# Patient Record
Sex: MALE | Race: BLACK OR AFRICAN AMERICAN | Employment: FULL TIME | URBAN - METROPOLITAN AREA
[De-identification: names, ages, dates, MRNs, and addresses within clinical notes are randomized per-mention and may not be internally consistent; named-entity substitution may affect disease eponyms.]

---

## 2022-07-18 ENCOUNTER — HOSPITAL ENCOUNTER (EMERGENCY)
Age: 22
Discharge: HOME | End: 2022-07-18

## 2022-07-18 VITALS
OXYGEN SATURATION: 99 % | DIASTOLIC BLOOD PRESSURE: 94 MMHG | RESPIRATION RATE: 18 BRPM | HEART RATE: 98 BPM | TEMPERATURE: 100.7 F | SYSTOLIC BLOOD PRESSURE: 148 MMHG | WEIGHT: 155.2 LBS

## 2022-07-18 DIAGNOSIS — R03.0 ELEVATED BLOOD PRESSURE READING: ICD-10-CM

## 2022-07-18 DIAGNOSIS — J02.0 STREP PHARYNGITIS: Primary | ICD-10-CM

## 2022-07-18 PROCEDURE — 6370000000 HC RX 637 (ALT 250 FOR IP): Performed by: PHYSICIAN ASSISTANT

## 2022-07-18 PROCEDURE — 6360000002 HC RX W HCPCS: Performed by: PHYSICIAN ASSISTANT

## 2022-07-18 PROCEDURE — 99283 EMERGENCY DEPT VISIT LOW MDM: CPT

## 2022-07-18 RX ORDER — DEXAMETHASONE 4 MG/1
10 TABLET ORAL ONCE
Status: COMPLETED | OUTPATIENT
Start: 2022-07-18 | End: 2022-07-18

## 2022-07-18 RX ORDER — IBUPROFEN 400 MG/1
800 TABLET ORAL ONCE
Status: COMPLETED | OUTPATIENT
Start: 2022-07-18 | End: 2022-07-18

## 2022-07-18 RX ORDER — AMOXICILLIN 500 MG/1
500 CAPSULE ORAL 2 TIMES DAILY
Qty: 20 CAPSULE | Refills: 0 | Status: SHIPPED | OUTPATIENT
Start: 2022-07-18 | End: 2022-07-20 | Stop reason: ALTCHOICE

## 2022-07-18 RX ORDER — AMOXICILLIN 500 MG/1
500 CAPSULE ORAL 2 TIMES DAILY
Qty: 20 CAPSULE | Refills: 0 | Status: SHIPPED | OUTPATIENT
Start: 2022-07-18 | End: 2022-07-18

## 2022-07-18 RX ADMIN — DEXAMETHASONE 10 MG: 4 TABLET ORAL at 15:17

## 2022-07-18 RX ADMIN — IBUPROFEN 800 MG: 400 TABLET, FILM COATED ORAL at 15:17

## 2022-07-18 NOTE — ED NOTES
Pt discharged back home, reviewed discharged instructions with pt follow up care , pain control and return precautions discussed , pt verbalized understanding.  Pt ambulated out of the department with steady gait          Trev Marcano RN  07/18/22 4743

## 2022-07-18 NOTE — LETTER
[facility]  [address] [address]  Phone: [phone]               July 18, 2022    Patient: Lisa Freire   YOB: 2000   Date of Visit: 7/18/2022       To Whom It May Concern:    Lisa Freire was seen and treated in our emergency department on 7/18/2022. He may return to work on 7/19/22.       Sincerely,               Signature:__________________________________

## 2022-07-18 NOTE — ED PROVIDER NOTES
[address] [facility]        Pt Name: Angelina Rose  MRN: [id number]  [place] 2000  Date of evaluation: 7/18/2022  Provider: CECILIA Moore      ADVANCED PRACTICE PROVIDER, I HAVE EVALUATED THIS PATIENT    CHIEF COMPLAINT     Sore throat      HISTORY OF PRESENT ILLNESS  (Location/Symptom, Timing/Onset, Context/Setting, Quality, Duration,Modifying Factors, Severity.)   Angelina Rose is a 22 y.o. male who presents to the emergencydepartment for sore throat since yesterday. Feels similar prior episode of strep throat. He has had a little bit of a nonproductive cough today. Denies known fever. Denies congestion rhinorrhea. Denies nausea vomiting. No health problems. No known sick exposures. Nursing Notes were reviewed and I agree. REVIEW OF SYSTEMS    (2-9 systems for level 4, 10 or more for level 5)   Review of Systems     Pertinent positives and negatives as per HPI. All other systems reviewed and are negative except as noted    PAST MEDICAL HISTORY   No past medical history on file. SURGICAL HISTORY   No past surgical history on file. CURRENT MEDICATIONS       Previous Medications    No medications on file       ALLERGIES     Patient has no known allergies. FAMILY HISTORY     No family history on file. No family status information on file. SOCIAL HISTORY          PHYSICAL EXAM    (up to 7 for level 4, 8 or more for level 5)     ED Triage Vitals [07/18/22 1456]   BP Temp Temp Source Heart Rate Resp SpO2 Height Weight   (!) 148/94 (!) 100.7 °F (38.2 °C) Oral 98 18 99 % -- 155 lb 3.3 oz (70.4 kg)       Physical Exam  Constitutional:       General: He is not in acute distress. Appearance: Normal appearance. He is well-developed. He is not ill-appearing, toxic-appearing or diaphoretic. HENT:      Head: Normocephalic and atraumatic.       Right Ear: Tympanic membrane, ear canal and external ear normal. Left Ear: Tympanic membrane, ear canal and external ear normal.      Mouth/Throat:      Mouth: Mucous membranes are moist.      Pharynx: Oropharyngeal exudate and posterior oropharyngeal erythema present. Comments: Moderate amount of symmetric tonsillar edema bilaterally with erythema and purulent exudate. Uvula midline. No trismus. No difficulty swallowing or handling secretions. No signs of airway compromise. No peritonsillar abscess. Cardiovascular:      Rate and Rhythm: Normal rate and regular rhythm. Heart sounds: Normal heart sounds. Pulmonary:      Effort: Pulmonary effort is normal. No respiratory distress. Breath sounds: Normal breath sounds. Musculoskeletal:         General: Normal range of motion. Cervical back: Normal range of motion and neck supple. Lymphadenopathy:      Cervical: Cervical adenopathy present. Skin:     General: Skin is warm. Neurological:      Mental Status: He is alert. Psychiatric:         Mood and Affect: Mood normal.         Behavior: Behavior normal.         Thought Content: Thought content normal.         Judgment: Judgment normal.       DIFFERENTIAL DIAGNOSIS   Strep, peritonsillar abscess, URI, COVID, other    DIAGNOSTICRESULTS         RADIOLOGY:   Non-plain film images such as CT, Ultrasound and MRI are read by the radiologist. Plain radiographic images are visualized and preliminarily interpreted by CECILIA Kilpatrick with the below findings:      Interpretation per the Radiologist below, if available at the time of this note:    No orders to display         LABS:  Labs Reviewed - No data to display    All other labs were within normal range or not returned as of this dictation.     EMERGENCY DEPARTMENT COURSE and DIFFERENTIALDIAGNOSIS/MDM:   Vitals:    Vitals:    07/18/22 1456   BP: (!) 148/94   Pulse: 98   Resp: 18   Temp: (!) 100.7 °F (38.2 °C)   TempSrc: Oral   SpO2: 99%   Weight: 155 lb 3.3 oz (70.4 kg)       Patient wasnontoxic, well appearing, febrile with a rate of 100.7 and hypertension 148/94. Saturating well on room air. Clinically, she has strep throat. No peritonsillar abscess. Will treat with amox. Given Decadron and ibuprofen here. Instructed t FU with PCP for reeval and to  return for worsening. He agreed and understood. Is this patient to be included in the SEP-1 Core Measure due to severe sepsis or septic shock? No   Exclusion criteria - the patient is NOT to be included for SEP-1 Core Measure due to:  May have criteria for sepsis, but does not meet criteria for severe sepsis or septic shock        PROCEDURES:  None    FINAL IMPRESSION      1. Strep pharyngitis    2. Elevated blood pressure reading        DISPOSITION/PLAN   DISPOSITION Decision To Discharge 07/18/2022 03:13:09 PM      PATIENT REFERRED TO:  [facility]) Pre-Services  [phone]  Schedule an appointment as soon as possible for a visit   for reevaluation and to recheck your blood pressure    [facility] Emergency Department  [address] [place] [address]  [phone]    As needed, If symptoms worsen      DISCHARGE MEDICATIONS:  New Prescriptions    AMOXICILLIN (AMOXIL) 500 MG CAPSULE    Take 1 capsule by mouth in the morning and 1 capsule before bedtime. Do all this for 10 days.        (Please note that portions ofthis note were completed with a voice recognition program.  Efforts were made to edit the dictations but occasionally words are mis-transcribed.)    Viviana Parikh, [address] [place] [address]  07/18/22 4717

## 2022-07-19 NOTE — ED NOTES
Patient was discharged a few hours ago. He forgot to take his paper prescription with him. He called the ER to see how he can get the prescription filled. I reviewed his record, found he was diagnosed clinically with strep throat, and a prescription for amoxicillin had been printed on paper. He requested that the prescription be sent to [email or web address] [address]. This was provided to him.      Angel Baileyma  07/18/22 2010

## 2022-07-20 ENCOUNTER — APPOINTMENT (OUTPATIENT)
Dept: CT IMAGING | Age: 22
End: 2022-07-20

## 2022-07-20 ENCOUNTER — HOSPITAL ENCOUNTER (EMERGENCY)
Age: 22
Discharge: HOME | End: 2022-07-21
Attending: EMERGENCY MEDICINE

## 2022-07-20 VITALS
WEIGHT: 153.44 LBS | HEART RATE: 88 BPM | BODY MASS INDEX: 24.08 KG/M2 | TEMPERATURE: 98 F | RESPIRATION RATE: 18 BRPM | OXYGEN SATURATION: 98 % | SYSTOLIC BLOOD PRESSURE: 140 MMHG | HEIGHT: 67 IN | DIASTOLIC BLOOD PRESSURE: 79 MMHG

## 2022-07-20 DIAGNOSIS — J36 PERITONSILLAR ABSCESS: Primary | ICD-10-CM

## 2022-07-20 LAB
A/G RATIO: 1.3 (ref 1.1–2.2)
ALBUMIN SERPL-MCNC: 4.4 G/DL (ref 3.4–5)
ALP BLD-CCNC: 76 U/L (ref 40–129)
ALT SERPL-CCNC: 13 U/L (ref 10–40)
ANION GAP SERPL CALCULATED.3IONS-SCNC: 15 MMOL/L (ref 3–16)
AST SERPL-CCNC: 16 U/L (ref 15–37)
BASOPHILS ABSOLUTE: 0.1 K/UL (ref 0–0.2)
BASOPHILS RELATIVE PERCENT: 0.7 %
BILIRUB SERPL-MCNC: 0.6 MG/DL (ref 0–1)
BUN BLDV-MCNC: 12 MG/DL (ref 7–20)
CALCIUM SERPL-MCNC: 9.1 MG/DL (ref 8.3–10.6)
CHLORIDE BLD-SCNC: 101 MMOL/L (ref 99–110)
CO2: 25 MMOL/L (ref 21–32)
CREAT SERPL-MCNC: 1.2 MG/DL (ref 0.9–1.3)
EOSINOPHILS ABSOLUTE: 0.3 K/UL (ref 0–0.6)
EOSINOPHILS RELATIVE PERCENT: 1.9 %
GFR AFRICAN AMERICAN: >60
GFR NON-AFRICAN AMERICAN: >60
GLUCOSE BLD-MCNC: 98 MG/DL (ref 70–99)
HCT VFR BLD CALC: 40.7 % (ref 40.5–52.5)
HEMOGLOBIN: 13.1 G/DL (ref 13.5–17.5)
LYMPHOCYTES ABSOLUTE: 1.7 K/UL (ref 1–5.1)
LYMPHOCYTES RELATIVE PERCENT: 9.7 %
MCH RBC QN AUTO: 23.4 PG (ref 26–34)
MCHC RBC AUTO-ENTMCNC: 32.1 G/DL (ref 31–36)
MCV RBC AUTO: 72.8 FL (ref 80–100)
MONOCYTES ABSOLUTE: 1.5 K/UL (ref 0–1.3)
MONOCYTES RELATIVE PERCENT: 8.9 %
NEUTROPHILS ABSOLUTE: 13.6 K/UL (ref 1.7–7.7)
NEUTROPHILS RELATIVE PERCENT: 78.8 %
PDW BLD-RTO: 13.6 % (ref 12.4–15.4)
PLATELET # BLD: 191 K/UL (ref 135–450)
PMV BLD AUTO: 9.2 FL (ref 5–10.5)
POTASSIUM REFLEX MAGNESIUM: 3.9 MMOL/L (ref 3.5–5.1)
RBC # BLD: 5.58 M/UL (ref 4.2–5.9)
S PYO AG THROAT QL: NEGATIVE
SODIUM BLD-SCNC: 141 MMOL/L (ref 136–145)
TOTAL PROTEIN: 7.9 G/DL (ref 6.4–8.2)
WBC # BLD: 17.2 K/UL (ref 4–11)

## 2022-07-20 PROCEDURE — 87081 CULTURE SCREEN ONLY: CPT

## 2022-07-20 PROCEDURE — 6370000000 HC RX 637 (ALT 250 FOR IP): Performed by: EMERGENCY MEDICINE

## 2022-07-20 PROCEDURE — 6360000002 HC RX W HCPCS: Performed by: EMERGENCY MEDICINE

## 2022-07-20 PROCEDURE — 85025 COMPLETE CBC W/AUTO DIFF WBC: CPT

## 2022-07-20 PROCEDURE — 96365 THER/PROPH/DIAG IV INF INIT: CPT

## 2022-07-20 PROCEDURE — 36415 COLL VENOUS BLD VENIPUNCTURE: CPT

## 2022-07-20 PROCEDURE — 80053 COMPREHEN METABOLIC PANEL: CPT

## 2022-07-20 PROCEDURE — 87205 SMEAR GRAM STAIN: CPT

## 2022-07-20 PROCEDURE — 87880 STREP A ASSAY W/OPTIC: CPT

## 2022-07-20 PROCEDURE — 70491 CT SOFT TISSUE NECK W/DYE: CPT

## 2022-07-20 PROCEDURE — 87070 CULTURE OTHR SPECIMN AEROBIC: CPT

## 2022-07-20 PROCEDURE — 96375 TX/PRO/DX INJ NEW DRUG ADDON: CPT

## 2022-07-20 PROCEDURE — 2580000003 HC RX 258: Performed by: EMERGENCY MEDICINE

## 2022-07-20 PROCEDURE — 99285 EMERGENCY DEPT VISIT HI MDM: CPT

## 2022-07-20 PROCEDURE — 6360000004 HC RX CONTRAST MEDICATION: Performed by: EMERGENCY MEDICINE

## 2022-07-20 RX ORDER — AMOXICILLIN AND CLAVULANATE POTASSIUM 875; 125 MG/1; MG/1
1 TABLET, FILM COATED ORAL 2 TIMES DAILY
Qty: 20 TABLET | Refills: 0 | Status: SHIPPED | OUTPATIENT
Start: 2022-07-20 | End: 2022-07-30

## 2022-07-20 RX ORDER — KETOROLAC TROMETHAMINE 15 MG/ML
15 INJECTION, SOLUTION INTRAMUSCULAR; INTRAVENOUS ONCE
Status: COMPLETED | OUTPATIENT
Start: 2022-07-20 | End: 2022-07-20

## 2022-07-20 RX ORDER — ONDANSETRON 2 MG/ML
4 INJECTION INTRAMUSCULAR; INTRAVENOUS
Status: DISCONTINUED | OUTPATIENT
Start: 2022-07-20 | End: 2022-07-21 | Stop reason: HOSPADM

## 2022-07-20 RX ORDER — DEXAMETHASONE SODIUM PHOSPHATE 4 MG/ML
10 INJECTION, SOLUTION INTRA-ARTICULAR; INTRALESIONAL; INTRAMUSCULAR; INTRAVENOUS; SOFT TISSUE ONCE
Status: COMPLETED | OUTPATIENT
Start: 2022-07-20 | End: 2022-07-20

## 2022-07-20 RX ADMIN — IOPAMIDOL 100 ML: 755 INJECTION, SOLUTION INTRAVENOUS at 22:59

## 2022-07-20 RX ADMIN — AMPICILLIN AND SULBACTAM 3000 MG: 1; 2 INJECTION, POWDER, FOR SOLUTION INTRAMUSCULAR; INTRAVENOUS at 22:24

## 2022-07-20 RX ADMIN — TOPICAL ANESTHETIC: 200 SPRAY DENTAL; PERIODONTAL at 23:35

## 2022-07-20 RX ADMIN — KETOROLAC TROMETHAMINE 15 MG: 15 INJECTION, SOLUTION INTRAMUSCULAR; INTRAVENOUS at 22:25

## 2022-07-20 RX ADMIN — DEXAMETHASONE SODIUM PHOSPHATE 10 MG: 4 INJECTION, SOLUTION INTRAMUSCULAR; INTRAVENOUS at 22:24

## 2022-07-20 ASSESSMENT — PAIN - FUNCTIONAL ASSESSMENT
PAIN_FUNCTIONAL_ASSESSMENT: NONE - DENIES PAIN
PAIN_FUNCTIONAL_ASSESSMENT: NONE - DENIES PAIN

## 2022-07-21 NOTE — ED PROVIDER NOTES
Intimate Partner Violence: Not on file   Housing Stability: Not on file       Nursing notes reviewed. ED Triage Vitals [07/20/22 2121]   Enc Vitals Group      BP (!) 142/81      Heart Rate 89      Resp 18      Temp 98 °F (36.7 °C)      Temp Source Oral      SpO2 99 %      Weight 153 lb 7 oz (69.6 kg)      Height 5' 7\" (1.702 m)      Head Circumference       Peak Flow       Pain Score       Pain Loc       Pain Edu? Excl. in [address]? GENERAL:  Awake, alert. Well developed, well nourished. No respiratory distress. Managing secretions. HENT:  Normocephalic, Atraumatic, moist mucous membranes. Muffled voice. Right peritonsillar abscess noted. EYES:  Pupils equal round and reactive to light, Conjunctiva normal, extraocular movements normal.  NECK:  No meningeal signs, Supple. CHEST:  Regular rate and rhythm, chest wall non-tender. LUNGS:  Clear to auscultation bilaterally. ABDOMEN:  Soft, non-tender, no rebound, rigidity or guarding, non-distended, normal bowel sounds. No costovertebral angle tenderness to palpation. BACK:  No tenderness. EXTREMITIES:  Normal range of motion, no edema, no bony tenderness, no deformity, distal pulses present. SKIN: Warm, dry and intact. NEUROLOGIC: Normal mental status. Moving all extremities to command. RADIOLOGY  X-RAYS:  I have reviewed radiologic plain film image(s). ALL OTHER NON-PLAIN FILM IMAGES SUCH AS CT, ULTRASOUND AND MRI HAVE BEEN READ BY THE RADIOLOGIST. CT SOFT TISSUE NECK W CONTRAST   Final Result   Tonsillitis with right-sided PTA. Mild bilateral cervical lymphadenopathy, likely reactive.               LABS  Labs Reviewed   CBC WITH AUTO DIFFERENTIAL - Abnormal; Notable for the following components:       Result Value    WBC 17.2 (*)     Hemoglobin 13.1 (*)     MCV 72.8 (*)     MCH 23.4 (*)     Neutrophils Absolute 13.6 (*)     Monocytes Absolute 1.5 (*)     All other components within normal limits   STREP SCREEN GROUP A THROAT CULTURE, BETA STREP CONFIRM PLATES   CULTURE, WOUND   COMPREHENSIVE METABOLIC PANEL W/ REFLEX TO MG FOR LOW K       PROCEDURES  PROCEDURE:  INCISION & DRAINAGE  Gary Fabry or their surrogate had an opportunity to ask questions, and the risks, benefits, and alternatives were discussed. The patient was sitting upright in the area of the abscess was anesthetized with HurriCaine spray. Patient was holding Yankauer suction himself as well as a 3 MAC blade for visualization. I then further anesthetized the area with 2 cc of 1% lidocaine with epinephrine. Using an 18-gauge needle with a cover in place that had been trimmed to allow a centimeter and a half of needle to protrude I was able to access the abscess and obtain 10 cc of purulent material.  Patient tolerated the procedure well with minimal pain and minimal bleeding. There were no complications during the procedure. MEDICAL DECISION MAKING          The total Critical Care time is 30 minutes which excludes separately billable procedures. The critical care was concerning treatment of peritonsillar abscess with IV antibiotics and fluids. This time is exclusive of any time documented by any other providers. After the abscess was drained patient was able to speak clearly and requested potato chips and was eating Lays potato chips prior to discharge. I advised the patient to return to the emergency department immediately for any new or worsening symptoms, such as fever, vomiting or increased pain or any trouble breathing. The patient voiced agreement and understanding of the treatment plan. I estimate there is LOW risk for EPIGLOTTITIS, PNEUMONIA, MENINGITIS, OR URINARY TRACT INFECTION, thus I consider the discharge disposition reasonable. Also, there is no evidence or peritonitis, sepsis, or toxicity.  Gary Fabry and I have discussed the diagnosis and risks, and we agree with discharging home to follow-up with their primary doctor. We also discussed returning to the Emergency Department immediately if new or worsening symptoms occur. We have discussed the symptoms which are most concerning (e.g., changing or worsening pain, trouble swallowing or breating, neck stiffness, fever) that necessitate immediate return. Final Impression    1. Peritonsillar abscess        Discharge Vital Signs:  Blood pressure (!) 140/79, pulse 88, temperature 98 °F (36.7 °C), temperature source Oral, resp. rate 18, height 5' 7\" (1.702 m), weight 153 lb 7 oz (69.6 kg), SpO2 98 %. Patient was given scripts for the following medications. I counseled patient how to take these medications. Discharge Medication List as of 7/20/2022 11:57 PM        START taking these medications    Details   amoxicillin-clavulanate (AUGMENTIN) 875-125 MG per tablet Take 1 tablet by mouth in the morning and 1 tablet before bedtime. Do all this for 10 days. , Disp-20 tablet, R-0Normal             Disposition  Pt is in good condition upon Discharge to home. This chart was generated using the [address] [place] [address] [address]  dictation system. I created this record but it may contain dictation errors.           Consuelo Mohr MD  07/21/22 9876

## 2022-07-23 LAB — S PYO THROAT QL CULT: NORMAL

## 2022-07-24 LAB
GRAM STAIN RESULT: NORMAL
WOUND/ABSCESS: NORMAL

## 2022-07-29 ENCOUNTER — HOSPITAL ENCOUNTER (EMERGENCY)
Age: 22
Discharge: HOME | End: 2022-07-30

## 2022-07-29 VITALS
OXYGEN SATURATION: 99 % | WEIGHT: 160 LBS | RESPIRATION RATE: 16 BRPM | BODY MASS INDEX: 22.9 KG/M2 | TEMPERATURE: 97.9 F | HEIGHT: 70 IN | HEART RATE: 67 BPM | SYSTOLIC BLOOD PRESSURE: 128 MMHG | DIASTOLIC BLOOD PRESSURE: 74 MMHG

## 2022-07-30 NOTE — ED TRIAGE NOTES
Pt. noticed a rash to bilateral arms and chest x2 days. Admits to itching, denies any SOB or tongue swelling. Started on Augmentin on 7/20/ 2022.

## 2024-10-25 ENCOUNTER — HOSPITAL ENCOUNTER (EMERGENCY)
Age: 24
Discharge: HOME | End: 2024-10-25

## 2024-10-25 VITALS
RESPIRATION RATE: 17 BRPM | HEIGHT: 69 IN | WEIGHT: 163.58 LBS | OXYGEN SATURATION: 99 % | HEART RATE: 57 BPM | TEMPERATURE: 98.2 F | DIASTOLIC BLOOD PRESSURE: 87 MMHG | BODY MASS INDEX: 24.23 KG/M2 | SYSTOLIC BLOOD PRESSURE: 130 MMHG

## 2024-10-25 DIAGNOSIS — T78.1XXA ALLERGIC REACTION TO TREE NUT: Primary | ICD-10-CM

## 2024-10-25 PROCEDURE — 96374 THER/PROPH/DIAG INJ IV PUSH: CPT

## 2024-10-25 PROCEDURE — 99284 EMERGENCY DEPT VISIT MOD MDM: CPT

## 2024-10-25 PROCEDURE — 6370000000 HC RX 637 (ALT 250 FOR IP): Performed by: NURSE PRACTITIONER

## 2024-10-25 PROCEDURE — 2580000003 HC RX 258: Performed by: NURSE PRACTITIONER

## 2024-10-25 PROCEDURE — 6360000002 HC RX W HCPCS: Performed by: NURSE PRACTITIONER

## 2024-10-25 RX ORDER — METHYLPREDNISOLONE SODIUM SUCCINATE 125 MG/2ML
60 INJECTION, POWDER, LYOPHILIZED, FOR SOLUTION INTRAMUSCULAR; INTRAVENOUS ONCE
Status: COMPLETED | OUTPATIENT
Start: 2024-10-25 | End: 2024-10-25

## 2024-10-25 RX ORDER — 0.9 % SODIUM CHLORIDE 0.9 %
1000 INTRAVENOUS SOLUTION INTRAVENOUS ONCE
Status: COMPLETED | OUTPATIENT
Start: 2024-10-25 | End: 2024-10-25

## 2024-10-25 RX ORDER — DIPHENHYDRAMINE HCL 25 MG
25 CAPSULE ORAL EVERY 4 HOURS PRN
Qty: 25 CAPSULE | Refills: 0 | Status: SHIPPED | OUTPATIENT
Start: 2024-10-25 | End: 2024-10-30

## 2024-10-25 RX ORDER — FAMOTIDINE 20 MG/1
20 TABLET, FILM COATED ORAL DAILY
Qty: 5 TABLET | Refills: 0 | Status: SHIPPED | OUTPATIENT
Start: 2024-10-25 | End: 2024-10-30

## 2024-10-25 RX ORDER — EPINEPHRINE 0.3 MG/.3ML
0.3 INJECTION SUBCUTANEOUS ONCE
Qty: 0.3 ML | Refills: 0 | Status: SHIPPED | OUTPATIENT
Start: 2024-10-25 | End: 2024-10-25

## 2024-10-25 RX ORDER — CETIRIZINE HYDROCHLORIDE 10 MG/1
20 TABLET ORAL DAILY
Status: DISCONTINUED | OUTPATIENT
Start: 2024-10-25 | End: 2024-10-25 | Stop reason: HOSPADM

## 2024-10-25 RX ORDER — FAMOTIDINE 20 MG/1
20 TABLET, FILM COATED ORAL ONCE
Status: COMPLETED | OUTPATIENT
Start: 2024-10-25 | End: 2024-10-25

## 2024-10-25 RX ORDER — PREDNISONE 20 MG/1
20 TABLET ORAL DAILY
Qty: 5 TABLET | Refills: 0 | Status: SHIPPED | OUTPATIENT
Start: 2024-10-25 | End: 2024-10-30

## 2024-10-25 RX ADMIN — METHYLPREDNISOLONE SODIUM SUCCINATE 60 MG: 125 INJECTION INTRAMUSCULAR; INTRAVENOUS at 14:33

## 2024-10-25 RX ADMIN — SODIUM CHLORIDE 1000 ML: 9 INJECTION, SOLUTION INTRAVENOUS at 14:35

## 2024-10-25 RX ADMIN — CETIRIZINE HYDROCHLORIDE 20 MG: 10 TABLET, FILM COATED ORAL at 14:33

## 2024-10-25 RX ADMIN — FAMOTIDINE 20 MG: 20 TABLET, FILM COATED ORAL at 15:12

## 2024-10-25 ASSESSMENT — PAIN SCALES - GENERAL: PAINLEVEL_OUTOF10: 0

## 2024-10-25 ASSESSMENT — ENCOUNTER SYMPTOMS
VOICE CHANGE: 0
NAUSEA: 0
TROUBLE SWALLOWING: 0
COUGH: 1
FACIAL SWELLING: 0
ANAL BLEEDING: 0
ABDOMINAL PAIN: 0
EYE PAIN: 0
SHORTNESS OF BREATH: 0
VOMITING: 0
SORE THROAT: 0
BACK PAIN: 0

## 2024-10-25 ASSESSMENT — LIFESTYLE VARIABLES
HOW OFTEN DO YOU HAVE A DRINK CONTAINING ALCOHOL: NEVER
HOW MANY STANDARD DRINKS CONTAINING ALCOHOL DO YOU HAVE ON A TYPICAL DAY: PATIENT DOES NOT DRINK

## 2024-10-25 ASSESSMENT — PAIN - FUNCTIONAL ASSESSMENT: PAIN_FUNCTIONAL_ASSESSMENT: 0-10

## 2024-10-25 NOTE — DISCHARGE INSTRUCTIONS
Return to the emergency department for new or worsening symptoms including, not limited to, developing shortness of breath, wheezing, throat closing sensation, inability to handle your own saliva/secretions, voice change, or other symptoms/concerns.      Medications as prescribed.      Follow-up with the [facility] residency clinic in order to establish a primary care provider for your future nonemergent medical needs.        [facility] internal medicine residency practice  [address], [place] [id number] [phone]         [facility]  [address] [place] [address] [phone]      [facility] Pre-Services  [phone]

## 2024-10-25 NOTE — ED TRIAGE NOTES
Pt presents to the ED with c/c of allergic reaction to cashews. Pt states around 1300 today and is experiencing hives, all over itching and throat itching . Took 200mg benadryl PO prior to arrival.

## 2024-10-25 NOTE — ED PROVIDER NOTES
[facility]  EMERGENCY DEPARTMENT ENCOUNTER        Pt Name: Blaze Valdovinos  MRN: [id number]  Birthdate 2000  Date of evaluation: 10/25/2024  Provider: NIKKI Conn CNP  PCP: No primary care provider on file.  Note Started: 2:30 PM EDT 10/25/24      TAYLOR. I have evaluated this patient.        CHIEF COMPLAINT       Chief Complaint   Patient presents with    Allergic Reaction     Pt presents to the ED with c/c of allergic reaction to cashews. Pt states around 1300 today and is experiencing hives, all over itching and throat itching . Took 200mg benadryl PO prior to arrival.        HISTORY OF PRESENT ILLNESS: 1 or more Elements     History from : Patient    Limitations to history : None    Blaze Valdovinos is a 24 y.o. nontoxic, well-appearing male with medical reason including, not limited to, allergy to nuts who presents To the emergency department for evaluation status post he was reportedly given cashews by his boss at 1300 hrs. and developed diffuse skin itching itchy tongue and felt like his tongue was swelling.  He had a mild cough as well.  Patient endorses that he took Benadryl 200 mg at 1330 hrs. and his symptoms seem to significantly improved.  He states his tongue does not feel as if it is swelling anymore but he continues to experience mild itching.  Denies nausea, vomiting, shortness of breath, wheezing, fever, chills, sweats, diarrhea, voice change, trouble swallowing, facial swelling, or other symptoms/concerns.    Nursing Notes were all reviewed and agreed with or any disagreements were addressed in the HPI.    REVIEW OF SYSTEMS :      Review of Systems   Constitutional:  Negative for chills, diaphoresis, fatigue and fever.   HENT:  Negative for congestion, drooling, facial swelling, sore throat, trouble swallowing and voice change.    Eyes:  Negative for pain and visual disturbance.   Respiratory:  Positive for cough (Resolved). Negative for